# Patient Record
Sex: MALE | Race: WHITE
[De-identification: names, ages, dates, MRNs, and addresses within clinical notes are randomized per-mention and may not be internally consistent; named-entity substitution may affect disease eponyms.]

---

## 2017-03-07 ENCOUNTER — HOSPITAL ENCOUNTER (EMERGENCY)
Dept: HOSPITAL 22 - ER | Age: 37
Discharge: STILL ADMITTED OR ADMINISTRATIVE | End: 2017-03-07
Payer: COMMERCIAL

## 2017-03-07 VITALS — WEIGHT: 200 LBS | BODY MASS INDEX: 28.63 KG/M2 | HEIGHT: 70 IN

## 2017-03-07 VITALS — DIASTOLIC BLOOD PRESSURE: 74 MMHG | SYSTOLIC BLOOD PRESSURE: 122 MMHG

## 2017-03-07 DIAGNOSIS — R10.12: ICD-10-CM

## 2017-03-07 DIAGNOSIS — K50.911: Primary | ICD-10-CM

## 2017-03-07 DIAGNOSIS — Z72.0: ICD-10-CM

## 2017-03-07 DIAGNOSIS — K52.9: ICD-10-CM

## 2017-03-07 LAB
BASOPHILS # BLD AUTO: 2.4 K/MM3 (ref 0.7–4.5)
BUN: 14 MG/DL (ref 7–18)
EOSINOPHIL NFR BLD AUTO: 28.4 % (ref 10–50)
GFR SERPLBLD BASED ON 1.73 SQ M-ARVRAT: 96 ML/MIN (ref 60–?)
HCT VFR BLD CALC: 16.4 G/DL (ref 14.1–18)

## 2017-03-07 NOTE — EMERGENCY ROOM REPORT
History of Present Illness
Time Seen by MD Oconnor
Presenting Problem in Triage
Pt arrived:Walked    
Presenting Problem:BLEEDING FROM BOWELS 
Onset of symptoms date/time:03/0606/17/0800 or onset unknown for:
Treatment Prior to Arrival:    PTA Provided by:
 
Sepsis Risk Assessment: 
Temp: 98.2 B/P: 136/87 MAP: 103 Pulse: 100 Resp: 18
Recent fever? N Clinical Suspician of Infection? N 
Mental Status: 1 - Regular (Normal Baseline)   Sepsis Risk:Low Sepsis Risk 
 
Have you (or family members/close friends) recently traveled outside the [place]? N
If Yes, where/when: 
Have you had exposure to infectious disease within the past month? N TB? 
Other?  Specify: 
 
Comment
The patient has Crohn's disease. He complains of passing bright red blood per 
rectum for the past 18 hours. He says he has had 5 episodes passing about 1/2 
cup each time. He has some dull achy pain in his LEFT upper abdomen that started
about 36 hours ago. No fever. No vomiting. He feels fatigued. He has not been on
any medication for Crohn's disease for more than 10 years. Previously he was on 
Asacol. He had a gastroenterologist that he was sitting until about 7-8 months 
ago. He was supposed to have a colonoscopy, but there was a mixup and the 
scheduling. No evaluation or treatment since then. Approximately one year since 
his last flareup Crohn's disease.
ALLERGIES
Coded Allergies:
codeine (12/10/15)
 
Home Medications
Active Scripts
Prednisone (Prednisone 20MG*****) 20 MG PO BID 
     3 Days 
     Prov:      12/11/15
Nicotine (Nicoderm Cq) 21 MG TD DAILY 
     #30 PATCH 
     Prov:      12/11/15
 
 
(Carlos Velázquez MD)
 
History
 
Medical History
General
   CAD? No
   Angina: No
   MI: No
   Hypertension? No
   Hyperlipidemia? No
   CHF? No
   DVT? No
   PE? No
   COPD? No
   Asthma? No
   Anemia? No
   GERD? No
   Gastric ulcers? No
   GI Bleed? No
   Hernia? No
   Thyroid Problems? No
   Hypothyroidism? No
   CVA? No
   Seizures? No
   Diabetes? No
   Insulin Dependent: No
   Insulin Pump: No
   Home FSBS? No
   Renal Insuffiency? No
   End Stage Renal Disease? No
   UTI? No
   Stones? No
   BPH? No
   GB Disease: No
   Nephritic Syndrome? No
   Asplenia? No
   Hepatitis? No
   Sickle Cell Disease? No
   Arthritis? No
   Migraines? No
   Cataracts? No
   Glaucoma? No
   MRSA? No
   HIV? No
   TB? No
   Anxiety? No
   Depression? No
   Cancer? No
   More? Yes
   Additional hx:
CHRONS
Immunization Hx
   DT/Tetanus > 10 Years Ago
   Flu 2015-16FSN
   Pneumonia Refuses
Surgical Hx
Previous Surgery?Y  MASTOIDECTOMY L   EAR TUBES   FACIAL   MASTOIDECTOMY R
              
            
 
 
Family History
Family Hx
   Diabetes Yes
   CAD No
   Hypertension No
   Hyperlipidemia No
   Cancer Yes
   TB No
 
Social History
Smoking Hx
   Smoker: Current Every Day Smoker
   Tobacco: Yes
   Type Cigarettes
   Packs/day 1 1/2 - 2 Packs
Alcohol
   Alcohol: No
 
Additionial History
Additional History
reviewed records, admit here 12/10/15 for Crohn's flare.
(Carlos Velázquez MD)
 
Review of Systems
All Other Systems Reviewed and Negative
Constitutional
denies fever, malaise
Gastrointestinal
see HPI, abdominal pain, denies vomiting
(Carlos Velázquez MD)
 
Physical Exam
Vital Signs
 Vital Signs
 
 
Date Time Temp Pulse Resp B/P Pulse O2 O2 Flow FiO2
 
     Ox Delivery Rate 
 
03/07 2151  74 20 122/74 97   
 
03/07 2013  86 20 124/69 97   
 
03/07 1929  95 18 127/84 96   
 
03/07 1847 98.2 100 18 136/87 98   
 
 
General Appearance normal appearance, WD/WN
Eye Exam
   -
   bilateral eye normal exam, bilateral eye PERRL, bilateral eye EOMI
Ear, Nose, Throat hearing grossly normal, normal ENT inspection
Neck normal inspection, non-tender, supple, full range of motion
Respiratory Status
Yes: trachea midline, chest symmetrical, non tender chest.  No: respiratory 
distress. 
Lung Sounds
bilateral: normal breath sounds, lungs clear. 
Cardiovascular normal exam, regular rate/rhythm, no peripheral edema, no gallop,
no JVD, no murmur, no rub, normal peripheral pulses
Peripheral Pulses
   Pulses normal Yes
Gastrointestinal normal bowel sounds, soft, no organomegaly, no guarding, no 
rebound, tenderness (LEFT upper quadrant)
Back normal inspection, no CVA tenderness, no vertebral tenderness
Extremities non-tender, normal range of motion, normal inspection
Neurologic alert, CNs II-XII nml as tested, normal exam, oriented x 3
Mental status normal mood/affect
Skin intact, normal color, warm/dry
(Carlos Velázquez MD)
 
Medical Decision Making
 
LABS/Meds/Orders
Pt receiving controlled substance in ED? No
Results/Orders
 Laboratory Tests
 
 
03/07/17 1930:
Sodium 140, Potassium 4.5, Chloride 104, Carbon Dioxide 27, BUN 14, Creatinine 
0.9, Estimated Creat Clear 147, Estimated GFR (MDRD) 96, Glucose 94, Calcium 9.0
, Total Bilirubin 0.2, AST 8  L, ALT 36, Alkaline Phosphatase 53, Total Protein 
7.6, Albumin 3.9, Globulin 3.7  H, Albumin/Globulin Ratio 1.1, Lipase 101, WBC 
8.6, RBC 5.06, Hgb 16.4, Hct 48.7, MCV 96.2, RDW 13.4, Plt Count 197, MPV 6.0  L
, Gran % 63.4, Gran # 5.4, Lymphocytes % 28.4, Monocytes % 5.9, Eosinophils % 
1.7, Basophils % 0.5, Lymphocytes # 2.4, Monocytes # 0.5, Eosinophils # 0.2, 
Basophils # 0.0, PUBS MCHC 33.8, ESR 9, MCH 32.5  H
 Current Medication Orders
 
 
  Sig/Noman Start time  Last
 
Medication Dose Route Stop Time Status Admin
 
Iopamidol 75 ML ONCE ONE 03/07 2115 UNV 03/07
 
  IV 03/07 2116 2105
 
Sodium Chloride 10 ML ONCE ONE 03/07 2115 UNV 03/07
 
  IV 03/07 2116 2105
 
Diatrizoate Meglum/ 30 ML ONCE ONE 03/07 1915 DC 03/07
 
Diatrizoate Sod  PO 03/07 1916 1913
 
Sodium Chloride 10 ML PRN PRN 03/07 1915 AC 
 
  IV 03/08 1907  
 
Diatrizoate Meglum/ 0 .STK-MED ONE 03/07 1911 DC 
 
Diatrizoate Sod  .ROUTE   
 
 
 Orders
 
 
Procedure Date/time Status
 
DIET-NOTHING BY MOUTH 03/08 B Active
 
CT ABD & PELVIS W/ CONTRAST 03/07 2038 Active
 
CT ABD/PELVIS REQ************* 03/07 1908 Complete
 
IV SALINE LOCK 03/07 1908 Active
 
LIPASE 03/07 1908 Complete
 
SED RATE 03/07 1908 Complete
 
CBC WITH AUTO DIFF 03/07 1908 Complete
 
CHEM 12 PROFILE 03/07 1908 Complete
 
 
 
Progress
-
8:00 PM:  At shift change, I have discussed the patient with Dr. Jeong, who 
will assume care of the patient at this time.  I have discussed all clinical 
information including history, physical and diagnostic study results.  
Preliminary diagnoses based on information available at this point have been 
recorded by me.
(Carlos Velázquez MD)
 
XRAY/CT/US
XRAY/CT/US
   CT abdomen, pelvis
   CT interpretation by discussed w/radiologist
   Time results known: 2209
   CT Results abnormal (colitis)
(JANIE Jeong MD)
 
Departure
 
Departure
Disposition Still a Patient
Condition STABLE
ED Critical Care
   Critical Care No
(Carlos Velázquez MD)
 
Departure
Time of Disposition 2209
Clinical Impression
Primary Impression: Abdominal pain
Qualifiers:  Abdominal location: left upper quadrant Qualified Code: R10.12 - 
Left upper quadrant pain
Secondary Impressions: Colitis
 
Crohn's disease
Qualifiers:  Gastrointestinal tract location: unspecified location Digestive 
disease complication type: with rectal bleeding Qualified Code: K50.911 - Crohn'
s disease, unspecified, with rectal bleeding
 
Rectal bleeding
Patient Instructions DI for Abdominal Pain-Adult
Additional Instructions
fluids and call pcp for follow up 
Discharge Counseling
   Counseled pt/family regarding diagnosis, test results, medications/RX, follow
up needs
Prescriptions
Current Visit Scripts
Prednisone (Prednisone 20MG*****) 20 MG PO BID 
     #10 TAB 
 
 
(JANIE Jeong MD)
 
Electronically Signed by Carlos Velázquez MD on 03/07/17 at 1953
Electronically Signed by JANIE Jeong MD on 03/07/17 at 5444

## 2017-03-07 NOTE — EMERGENCY ROOM REPORT
History of Present Illness
Time Seen by MD Oconnor
Presenting Problem in Triage
Pt arrived:Walked    
Presenting Problem:BLEEDING FROM BOWELS 
Onset of symptoms date/time:03/0606/17/0800 or onset unknown for:
Treatment Prior to Arrival:    PTA Provided by:
 
Sepsis Risk Assessment: 
Temp: 98.2 B/P: 136/87 MAP: 103 Pulse: 100 Resp: 18
Recent fever? N Clinical Suspician of Infection? N 
Mental Status: 1 - Regular (Normal Baseline)   Sepsis Risk:Low Sepsis Risk 
 
Have you (or family members/close friends) recently traveled outside the [place]? N
If Yes, where/when: 
Have you had exposure to infectious disease within the past month? N TB? 
Other?  Specify: 
 
Comment
The patient has Crohn's disease. He complains of passing bright red blood per 
rectum for the past 18 hours. He says he has had 5 episodes passing about 1/2 
cup each time. He has some dull achy pain in his LEFT upper abdomen that started
about 36 hours ago. No fever. No vomiting. He feels fatigued. He has not been on
any medication for Crohn's disease for more than 10 years. Previously he was on 
Asacol. He had a gastroenterologist that he was sitting until about 7-8 months 
ago. He was supposed to have a colonoscopy, but there was a mixup and the 
scheduling. No evaluation or treatment since then. Approximately one year since 
his last flareup Crohn's disease.
ALLERGIES
Coded Allergies:
codeine (12/10/15)
 
Home Medications
Active Scripts
Prednisone (Prednisone 20MG*****) 20 MG PO BID 
     3 Days 
     Prov:      12/11/15
Nicotine (Nicoderm Cq) 21 MG TD DAILY 
     #30 PATCH 
     Prov:      12/11/15
 
 
(Carlos Velázquez MD)
 
History
 
Medical History
General
   CAD? No
   Angina: No
   MI: No
   Hypertension? No
   Hyperlipidemia? No
   CHF? No
   DVT? No
   PE? No
   COPD? No
   Asthma? No
   Anemia? No
   GERD? No
   Gastric ulcers? No
   GI Bleed? No
   Hernia? No
   Thyroid Problems? No
   Hypothyroidism? No
   CVA? No
   Seizures? No
   Diabetes? No
   Insulin Dependent: No
   Insulin Pump: No
   Home FSBS? No
   Renal Insuffiency? No
   End Stage Renal Disease? No
   UTI? No
   Stones? No
   BPH? No
   GB Disease: No
   Nephritic Syndrome? No
   Asplenia? No
   Hepatitis? No
   Sickle Cell Disease? No
   Arthritis? No
   Migraines? No
   Cataracts? No
   Glaucoma? No
   MRSA? No
   HIV? No
   TB? No
   Anxiety? No
   Depression? No
   Cancer? No
   More? Yes
   Additional hx:
CHRONS
Immunization Hx
   DT/Tetanus > 10 Years Ago
   Flu 2015-16FSN
   Pneumonia Refuses
Surgical Hx
Previous Surgery?Y  MASTOIDECTOMY L   EAR TUBES   FACIAL   MASTOIDECTOMY R
              
            
 
 
Family History
Family Hx
   Diabetes Yes
   CAD No
   Hypertension No
   Hyperlipidemia No
   Cancer Yes
   TB No
 
Social History
Smoking Hx
   Smoker: Current Every Day Smoker
   Tobacco: Yes
   Type Cigarettes
   Packs/day 1 1/2 - 2 Packs
Alcohol
   Alcohol: No
 
Additionial History
Additional History
reviewed records, admit here 12/10/15 for Crohn's flare.
(Carlos Velázquez MD)
 
Review of Systems
All Other Systems Reviewed and Negative
Constitutional
denies fever, malaise
Gastrointestinal
see HPI, abdominal pain, denies vomiting
(Carlos Velázquez MD)
 
Physical Exam
Vital Signs
 Vital Signs
 
 
Date Time Temp Pulse Resp B/P Pulse O2 O2 Flow FiO2
 
     Ox Delivery Rate 
 
03/07 2151  74 20 122/74 97   
 
03/07 2013  86 20 124/69 97   
 
03/07 1929  95 18 127/84 96   
 
03/07 1847 98.2 100 18 136/87 98   
 
 
General Appearance normal appearance, WD/WN
Eye Exam
   -
   bilateral eye normal exam, bilateral eye PERRL, bilateral eye EOMI
Ear, Nose, Throat hearing grossly normal, normal ENT inspection
Neck normal inspection, non-tender, supple, full range of motion
Respiratory Status
Yes: trachea midline, chest symmetrical, non tender chest.  No: respiratory 
distress. 
Lung Sounds
bilateral: normal breath sounds, lungs clear. 
Cardiovascular normal exam, regular rate/rhythm, no peripheral edema, no gallop,
no JVD, no murmur, no rub, normal peripheral pulses
Peripheral Pulses
   Pulses normal Yes
Gastrointestinal normal bowel sounds, soft, no organomegaly, no guarding, no 
rebound, tenderness (LEFT upper quadrant)
Back normal inspection, no CVA tenderness, no vertebral tenderness
Extremities non-tender, normal range of motion, normal inspection
Neurologic alert, CNs II-XII nml as tested, normal exam, oriented x 3
Mental status normal mood/affect
Skin intact, normal color, warm/dry
(Carlos Velázquez MD)
 
Medical Decision Making
 
LABS/Meds/Orders
Pt receiving controlled substance in ED? No
Results/Orders
 Laboratory Tests
 
 
03/07/17 1930:
Sodium 140, Potassium 4.5, Chloride 104, Carbon Dioxide 27, BUN 14, Creatinine 
0.9, Estimated Creat Clear 147, Estimated GFR (MDRD) 96, Glucose 94, Calcium 9.0
, Total Bilirubin 0.2, AST 8  L, ALT 36, Alkaline Phosphatase 53, Total Protein 
7.6, Albumin 3.9, Globulin 3.7  H, Albumin/Globulin Ratio 1.1, Lipase 101, WBC 
8.6, RBC 5.06, Hgb 16.4, Hct 48.7, MCV 96.2, RDW 13.4, Plt Count 197, MPV 6.0  L
, Gran % 63.4, Gran # 5.4, Lymphocytes % 28.4, Monocytes % 5.9, Eosinophils % 
1.7, Basophils % 0.5, Lymphocytes # 2.4, Monocytes # 0.5, Eosinophils # 0.2, 
Basophils # 0.0, PUBS MCHC 33.8, ESR 9, MCH 32.5  H
 Current Medication Orders
 
 
  Sig/Noman Start time  Last
 
Medication Dose Route Stop Time Status Admin
 
Iopamidol 75 ML ONCE ONE 03/07 2115 UNV 03/07
 
  IV 03/07 2116 2105
 
Sodium Chloride 10 ML ONCE ONE 03/07 2115 UNV 03/07
 
  IV 03/07 2116 2105
 
Diatrizoate Meglum/ 30 ML ONCE ONE 03/07 1915 DC 03/07
 
Diatrizoate Sod  PO 03/07 1916 1913
 
Sodium Chloride 10 ML PRN PRN 03/07 1915 AC 
 
  IV 03/08 1907  
 
Diatrizoate Meglum/ 0 .STK-MED ONE 03/07 1911 DC 
 
Diatrizoate Sod  .ROUTE   
 
 
 Orders
 
 
Procedure Date/time Status
 
DIET-NOTHING BY MOUTH 03/08 B Active
 
CT ABD & PELVIS W/ CONTRAST 03/07 2038 Active
 
CT ABD/PELVIS REQ************* 03/07 1908 Complete
 
IV SALINE LOCK 03/07 1908 Active
 
LIPASE 03/07 1908 Complete
 
SED RATE 03/07 1908 Complete
 
CBC WITH AUTO DIFF 03/07 1908 Complete
 
CHEM 12 PROFILE 03/07 1908 Complete
 
 
 
Progress
-
8:00 PM:  At shift change, I have discussed the patient with Dr. Jeong, who 
will assume care of the patient at this time.  I have discussed all clinical 
information including history, physical and diagnostic study results.  
Preliminary diagnoses based on information available at this point have been 
recorded by me.
(Carlos Velázquez MD)
 
XRAY/CT/US
XRAY/CT/US
   CT abdomen, pelvis
   CT interpretation by discussed w/radiologist
   Time results known: 2209
   CT Results abnormal (colitis)
(JANIE Jeong MD)
 
Departure
 
Departure
Disposition Still a Patient
Condition STABLE
ED Critical Care
   Critical Care No
(Carlos Velázquez MD)
 
Departure
Time of Disposition 2209
Clinical Impression
Primary Impression: Abdominal pain
Qualifiers:  Abdominal location: left upper quadrant Qualified Code: R10.12 - 
Left upper quadrant pain
Secondary Impressions: Colitis
 
Crohn's disease
Qualifiers:  Gastrointestinal tract location: unspecified location Digestive 
disease complication type: with rectal bleeding Qualified Code: K50.911 - Crohn'
s disease, unspecified, with rectal bleeding
 
Rectal bleeding
Patient Instructions DI for Abdominal Pain-Adult
Additional Instructions
fluids and call pcp for follow up 
Discharge Counseling
   Counseled pt/family regarding diagnosis, test results, medications/RX, follow
up needs
Prescriptions
Current Visit Scripts
Prednisone (Prednisone 20MG*****) 20 MG PO BID 
     #10 TAB 
 
 
(JANIE Jeong MD)
 
Electronically Signed by Carlos Velázquez MD on 03/07/17 at 1953
Electronically Signed by JANIE Jeong MD on 03/07/17 at 2069

## 2017-03-08 NOTE — RADIOLOGY REPORT PS360
CT ABD   PELVIS W/ CONTRAST
 
CLINICAL INDICATION: Crohn's disease, rectal bleeding, abdominal pain
CROHNS DISEASE, RECTAL BLEEDING, ABD PAIN
ORDERING PHYSICIAN: JANIE Jeong MD
PATIENT AGE:  36 years
 
 
COMPARISON: 12/10/2015
 
TECHNIQUE: Axial images obtained with sagittal and coronal reformats.
 
PROCEDURE: 
Oral Contrast: Redicat
IV Contrast: 75 mL of Isovue-370.
 
FINDINGS:
 
Lower thorax: No acute finding 
 
ABDOMEN:
Liver: No masses or biliary dilatation.
Gallbladder: Nondistended. No radio opaque stones.
Pancreas: No masses or peripancreatic fluid collections.
Spleen: Unremarkable.
Adrenals: No change 3 x 2 cm right adrenal mass consistent with adenoma
Kidneys/ureters: No masses. No renal calculi. No hydronephrosis. No perinephric
fluid collections. No ureteral dilatation or obvious ureteral calculi.
Stomach bowel: Nonspecific low attenuation within the colon wall with mild wall
thickening and may be due to decompressed bowel or colitis. Periumbilical hernia
containing fat
Appendix: No evidence of appendicitis.
 
PELVIS:
Reproductive: Unremarkable 
Bladder: Nondistended. No obvious stones or masses.
 
ABDOMEN & PELVIS: 
Peritoneum: No abnormal fluid collections. No obvious inflammatory changes. No
free air.
Lymph nodes: No enlarged lymph nodes apparent.
Vasculature: No evidence of abdominal aortic aneurysm. No retroperitoneal
hemorrhage evident.
Bones: No acute fracture
 
IMPRESSION: 
1. Nonspecific low attenuation within the colon with mild thickening which may
be due to decompressed bowel or mild colitis.
2. Otherwise negative CT abdomen pelvis

## 2020-02-27 ENCOUNTER — OFFICE VISIT (OUTPATIENT)
Dept: RETAIL CLINIC | Facility: CLINIC | Age: 40
End: 2020-02-27

## 2020-02-27 VITALS
SYSTOLIC BLOOD PRESSURE: 129 MMHG | HEART RATE: 101 BPM | TEMPERATURE: 97.9 F | WEIGHT: 213 LBS | HEIGHT: 70 IN | DIASTOLIC BLOOD PRESSURE: 89 MMHG | OXYGEN SATURATION: 96 % | RESPIRATION RATE: 15 BRPM | BODY MASS INDEX: 30.49 KG/M2

## 2020-02-27 DIAGNOSIS — R52 BODY ACHES: ICD-10-CM

## 2020-02-27 DIAGNOSIS — H60.312 ACUTE DIFFUSE OTITIS EXTERNA OF LEFT EAR: ICD-10-CM

## 2020-02-27 DIAGNOSIS — H65.112 ACUTE MUCOID OTITIS MEDIA OF LEFT EAR: Primary | ICD-10-CM

## 2020-02-27 DIAGNOSIS — J02.9 SORE THROAT: ICD-10-CM

## 2020-02-27 LAB
EXPIRATION DATE: NORMAL
EXPIRATION DATE: NORMAL
FLUAV AG NPH QL: NEGATIVE
FLUBV AG NPH QL: NEGATIVE
INTERNAL CONTROL: NORMAL
INTERNAL CONTROL: NORMAL
Lab: NORMAL
Lab: NORMAL
S PYO AG THROAT QL: NEGATIVE

## 2020-02-27 PROCEDURE — 99203 OFFICE O/P NEW LOW 30 MIN: CPT | Performed by: NURSE PRACTITIONER

## 2020-02-27 PROCEDURE — 87880 STREP A ASSAY W/OPTIC: CPT | Performed by: NURSE PRACTITIONER

## 2020-02-27 PROCEDURE — 87804 INFLUENZA ASSAY W/OPTIC: CPT | Performed by: NURSE PRACTITIONER

## 2020-02-27 RX ORDER — CEFDINIR 300 MG/1
300 CAPSULE ORAL 2 TIMES DAILY
Qty: 20 CAPSULE | Refills: 0 | Status: SHIPPED | OUTPATIENT
Start: 2020-02-27 | End: 2020-03-08

## 2020-02-27 RX ORDER — PSEUDOEPHEDRINE HCL 120 MG/1
120 TABLET, FILM COATED, EXTENDED RELEASE ORAL EVERY 12 HOURS
Qty: 20 TABLET | Refills: 0 | Status: SHIPPED | OUTPATIENT
Start: 2020-02-27 | End: 2020-03-08

## 2021-01-07 ENCOUNTER — HOSPITAL ENCOUNTER (OUTPATIENT)
Age: 41
End: 2021-01-07
Payer: COMMERCIAL

## 2021-01-07 DIAGNOSIS — U07.1: ICD-10-CM

## 2021-01-07 DIAGNOSIS — Z20.828: Primary | ICD-10-CM

## 2021-01-07 PROCEDURE — U0003 INFECTIOUS AGENT DETECTION BY NUCLEIC ACID (DNA OR RNA); SEVERE ACUTE RESPIRATORY SYNDROME CORONAVIRUS 2 (SARS-COV-2) (CORONAVIRUS DISEASE [COVID-19]), AMPLIFIED PROBE TECHNIQUE, MAKING USE OF HIGH THROUGHPUT TECHNOLOGIES AS DESCRIBED BY CMS-2020-01-R: HCPCS

## 2021-08-28 ENCOUNTER — HOSPITAL ENCOUNTER (EMERGENCY)
Dept: HOSPITAL 22 - ER | Age: 41
LOS: 1 days | Discharge: HOME | End: 2021-08-29
Payer: COMMERCIAL

## 2021-08-28 VITALS — OXYGEN SATURATION: 96 % | DIASTOLIC BLOOD PRESSURE: 70 MMHG | HEART RATE: 75 BPM | SYSTOLIC BLOOD PRESSURE: 130 MMHG

## 2021-08-28 VITALS
OXYGEN SATURATION: 97 % | HEART RATE: 87 BPM | RESPIRATION RATE: 19 BRPM | SYSTOLIC BLOOD PRESSURE: 111 MMHG | TEMPERATURE: 98 F | DIASTOLIC BLOOD PRESSURE: 70 MMHG | BODY MASS INDEX: 33 KG/M2

## 2021-08-28 DIAGNOSIS — R10.13: Primary | ICD-10-CM

## 2021-08-28 LAB
ALBUMIN LEVEL: 3.9 G/DL (ref 3.5–5)
ALBUMIN/GLOB SERPL: 1.2 {RATIO} (ref 1.1–1.8)
ALP ISO SERPL-ACNC: 54 U/L (ref 38–126)
ALT SERPLBLD-CCNC: 26 U/L (ref 12–78)
AMYLASE SERPL-CCNC: 70 U/L (ref 30–110)
ANION GAP SERPL CALC-SCNC: 16.2 MEQ/L (ref 5–15)
AST SERPL QL: 26 U/L (ref 17–59)
BILIRUBIN,TOTAL: 0.2 MG/DL (ref 0.2–1.3)
BUN SERPL-MCNC: 12 MG/DL (ref 9–20)
CALCIUM SPEC-MCNC: 9 MG/DL (ref 8.4–10.2)
CHLORIDE SPEC-SCNC: 105 MMOL/L (ref 98–107)
CO2 SERPL-SCNC: 24 MMOL/L (ref 22–30)
COLOR UR: YELLOW
CREAT BLD-SCNC: 0.9 MG/DL (ref 0.66–1.25)
CREATININE CLEARANCE ESTIMATED: 163 ML/MIN (ref 50–200)
ESTIMATED GLOMERULAR FILT RATE: 94 ML/MIN (ref 60–?)
GFR (AFRICAN AMERICAN): 114 ML/MIN (ref 60–?)
GLOBULIN SER CALC-MCNC: 3.2 G/DL (ref 1.3–3.2)
GLUCOSE: 105 MG/DL (ref 74–100)
HCT VFR BLD CALC: 47.3 % (ref 42–52)
HGB BLD-MCNC: 16 G/DL (ref 14.1–18)
LIPASE: 103 U/L (ref 23–300)
MCHC RBC-ENTMCNC: 33.8 G/DL (ref 31.8–35.4)
MCV RBC: 95.1 FL (ref 80–94)
MEAN CORPUSCULAR HEMOGLOBIN: 32.2 PG (ref 27–31.2)
MICRO URNS: (no result)
PH UR: 6 [PH] (ref 5–8.5)
PLATELET # BLD: 279 K/MM3 (ref 142–424)
POTASSIUM: 4.2 MMOL/L (ref 3.5–5.1)
PROT SERPL-MCNC: 7.1 G/DL (ref 6.3–8.2)
RBC # BLD AUTO: 4.97 M/MM3 (ref 4.6–6.2)
SODIUM SPEC-SCNC: 141 MMOL/L (ref 136–145)
SP GR UR: >= 1.03 (ref 1–1.03)
UROBILINOGEN UR QL: 0.2 EU/DL
WBC # BLD AUTO: 9.7 K/MM3 (ref 4.8–10.8)

## 2021-08-28 PROCEDURE — 99282 EMERGENCY DEPT VISIT SF MDM: CPT

## 2021-08-28 PROCEDURE — 80053 COMPREHEN METABOLIC PANEL: CPT

## 2021-08-28 PROCEDURE — 82150 ASSAY OF AMYLASE: CPT

## 2021-08-28 PROCEDURE — 81001 URINALYSIS AUTO W/SCOPE: CPT

## 2021-08-28 PROCEDURE — 83690 ASSAY OF LIPASE: CPT

## 2021-08-28 PROCEDURE — 96375 TX/PRO/DX INJ NEW DRUG ADDON: CPT

## 2021-08-28 PROCEDURE — 96374 THER/PROPH/DIAG INJ IV PUSH: CPT

## 2021-08-28 PROCEDURE — 85025 COMPLETE CBC W/AUTO DIFF WBC: CPT

## 2021-08-29 VITALS
RESPIRATION RATE: 16 BRPM | OXYGEN SATURATION: 96 % | DIASTOLIC BLOOD PRESSURE: 80 MMHG | TEMPERATURE: 98.1 F | HEART RATE: 77 BPM | SYSTOLIC BLOOD PRESSURE: 113 MMHG

## 2021-08-29 VITALS — DIASTOLIC BLOOD PRESSURE: 68 MMHG | SYSTOLIC BLOOD PRESSURE: 102 MMHG | OXYGEN SATURATION: 96 % | HEART RATE: 73 BPM

## 2021-12-23 ENCOUNTER — HOSPITAL ENCOUNTER (EMERGENCY)
Age: 41
Discharge: HOME | End: 2021-12-23
Payer: COMMERCIAL

## 2021-12-23 VITALS
HEART RATE: 94 BPM | SYSTOLIC BLOOD PRESSURE: 134 MMHG | DIASTOLIC BLOOD PRESSURE: 78 MMHG | RESPIRATION RATE: 18 BRPM | OXYGEN SATURATION: 98 % | TEMPERATURE: 98.06 F

## 2021-12-23 VITALS
TEMPERATURE: 98.1 F | DIASTOLIC BLOOD PRESSURE: 78 MMHG | RESPIRATION RATE: 18 BRPM | HEART RATE: 94 BPM | SYSTOLIC BLOOD PRESSURE: 134 MMHG

## 2021-12-23 VITALS — BODY MASS INDEX: 32.3 KG/M2

## 2021-12-23 VITALS — OXYGEN SATURATION: 98 % | RESPIRATION RATE: 18 BRPM

## 2021-12-23 DIAGNOSIS — F17.210: ICD-10-CM

## 2021-12-23 DIAGNOSIS — J02.9: ICD-10-CM

## 2021-12-23 DIAGNOSIS — S19.9XXA: Primary | ICD-10-CM

## 2021-12-23 PROCEDURE — 99202 OFFICE O/P NEW SF 15 MIN: CPT

## 2021-12-23 PROCEDURE — 70360 X-RAY EXAM OF NECK: CPT

## 2021-12-23 PROCEDURE — G0463 HOSPITAL OUTPT CLINIC VISIT: HCPCS

## 2022-09-23 ENCOUNTER — HOSPITAL ENCOUNTER (EMERGENCY)
Age: 42
Discharge: HOME | End: 2022-09-23
Payer: COMMERCIAL

## 2022-09-23 VITALS
TEMPERATURE: 97.88 F | HEART RATE: 98 BPM | OXYGEN SATURATION: 97 % | DIASTOLIC BLOOD PRESSURE: 99 MMHG | RESPIRATION RATE: 18 BRPM | SYSTOLIC BLOOD PRESSURE: 130 MMHG

## 2022-09-23 VITALS
TEMPERATURE: 97.88 F | RESPIRATION RATE: 18 BRPM | SYSTOLIC BLOOD PRESSURE: 130 MMHG | DIASTOLIC BLOOD PRESSURE: 99 MMHG | HEART RATE: 98 BPM

## 2022-09-23 VITALS — BODY MASS INDEX: 33.2 KG/M2

## 2022-09-23 DIAGNOSIS — J40: Primary | ICD-10-CM

## 2022-09-23 PROCEDURE — 99212 OFFICE O/P EST SF 10 MIN: CPT

## 2022-09-23 PROCEDURE — G0463 HOSPITAL OUTPT CLINIC VISIT: HCPCS

## 2022-09-23 PROCEDURE — 71046 X-RAY EXAM CHEST 2 VIEWS: CPT

## 2022-09-23 PROCEDURE — 96372 THER/PROPH/DIAG INJ SC/IM: CPT

## 2022-10-23 ENCOUNTER — HOSPITAL ENCOUNTER (EMERGENCY)
Age: 42
Discharge: HOME | End: 2022-10-23
Payer: COMMERCIAL

## 2022-10-23 VITALS
HEART RATE: 108 BPM | RESPIRATION RATE: 18 BRPM | TEMPERATURE: 98.06 F | DIASTOLIC BLOOD PRESSURE: 57 MMHG | SYSTOLIC BLOOD PRESSURE: 114 MMHG | OXYGEN SATURATION: 99 %

## 2022-10-23 VITALS
TEMPERATURE: 98 F | HEART RATE: 98 BPM | OXYGEN SATURATION: 99 % | SYSTOLIC BLOOD PRESSURE: 104 MMHG | RESPIRATION RATE: 18 BRPM | DIASTOLIC BLOOD PRESSURE: 69 MMHG

## 2022-10-23 VITALS — BODY MASS INDEX: 32.3 KG/M2

## 2022-10-23 DIAGNOSIS — L05.91: Primary | ICD-10-CM

## 2022-10-23 PROCEDURE — 99283 EMERGENCY DEPT VISIT LOW MDM: CPT

## 2022-10-23 PROCEDURE — 10080 I&D PILONIDAL CYST SIMPLE: CPT

## 2022-11-21 ENCOUNTER — HOSPITAL ENCOUNTER (EMERGENCY)
Age: 42
Discharge: HOME | End: 2022-11-21
Payer: COMMERCIAL

## 2022-11-21 VITALS — BODY MASS INDEX: 29.7 KG/M2

## 2022-11-21 VITALS
HEART RATE: 87 BPM | TEMPERATURE: 98 F | SYSTOLIC BLOOD PRESSURE: 130 MMHG | RESPIRATION RATE: 18 BRPM | OXYGEN SATURATION: 98 % | DIASTOLIC BLOOD PRESSURE: 80 MMHG

## 2022-11-21 VITALS
DIASTOLIC BLOOD PRESSURE: 80 MMHG | RESPIRATION RATE: 18 BRPM | HEART RATE: 87 BPM | OXYGEN SATURATION: 98 % | SYSTOLIC BLOOD PRESSURE: 130 MMHG | TEMPERATURE: 98 F

## 2022-11-21 DIAGNOSIS — H66.90: Primary | ICD-10-CM

## 2022-11-21 PROCEDURE — 99212 OFFICE O/P EST SF 10 MIN: CPT

## 2022-11-21 PROCEDURE — G0463 HOSPITAL OUTPT CLINIC VISIT: HCPCS

## 2022-11-21 PROCEDURE — 87804 INFLUENZA ASSAY W/OPTIC: CPT

## 2022-12-30 ENCOUNTER — HOSPITAL ENCOUNTER (EMERGENCY)
Age: 42
Discharge: HOME | End: 2022-12-30
Payer: COMMERCIAL

## 2022-12-30 VITALS
SYSTOLIC BLOOD PRESSURE: 119 MMHG | OXYGEN SATURATION: 98 % | HEART RATE: 89 BPM | RESPIRATION RATE: 16 BRPM | TEMPERATURE: 98.7 F | DIASTOLIC BLOOD PRESSURE: 68 MMHG

## 2022-12-30 VITALS — BODY MASS INDEX: 30.1 KG/M2

## 2022-12-30 VITALS
OXYGEN SATURATION: 98 % | SYSTOLIC BLOOD PRESSURE: 98 MMHG | HEART RATE: 103 BPM | DIASTOLIC BLOOD PRESSURE: 62 MMHG | TEMPERATURE: 98.06 F | RESPIRATION RATE: 16 BRPM

## 2022-12-30 DIAGNOSIS — L05.91: Primary | ICD-10-CM

## 2022-12-30 PROCEDURE — 99283 EMERGENCY DEPT VISIT LOW MDM: CPT

## 2022-12-30 PROCEDURE — 10080 I&D PILONIDAL CYST SIMPLE: CPT

## 2023-01-03 ENCOUNTER — HOSPITAL ENCOUNTER (OUTPATIENT)
Dept: HOSPITAL 22 - LAB | Age: 43
End: 2023-01-03
Payer: COMMERCIAL

## 2023-01-03 DIAGNOSIS — L05.91: Primary | ICD-10-CM

## 2023-01-03 LAB
ANION GAP SERPL CALC-SCNC: 14.4 MEQ/L (ref 5–15)
BUN SERPL-MCNC: 14 MG/DL (ref 9–20)
CALCIUM SPEC-MCNC: 8.8 MG/DL (ref 8.4–10.2)
CHLORIDE SPEC-SCNC: 104 MMOL/L (ref 98–107)
CO2 SERPL-SCNC: 26 MMOL/L (ref 22–30)
CREAT BLD-SCNC: 0.8 MG/DL (ref 0.66–1.25)
ESTIMATED GLOMERULAR FILT RATE: 107 ML/MIN (ref 60–?)
GFR (AFRICAN AMERICAN): 129 ML/MIN (ref 60–?)
GLUCOSE: 89 MG/DL (ref 74–100)
HCT VFR BLD CALC: 48.8 % (ref 42–52)
HGB BLD-MCNC: 16.2 G/DL (ref 14.1–18)
MCHC RBC-ENTMCNC: 33.2 G/DL (ref 31.8–35.4)
MCV RBC: 93.6 FL (ref 80–94)
MEAN CORPUSCULAR HEMOGLOBIN: 31 PG (ref 27–31.2)
PLATELET # BLD: 370 K/MM3 (ref 142–424)
POTASSIUM: 4.4 MMOL/L (ref 3.5–5.1)
RBC # BLD AUTO: 5.21 M/MM3 (ref 4.6–6.2)
SODIUM SPEC-SCNC: 140 MMOL/L (ref 136–145)
WBC # BLD AUTO: 8.3 K/MM3 (ref 4.8–10.8)

## 2023-01-03 PROCEDURE — 80048 BASIC METABOLIC PNL TOTAL CA: CPT

## 2023-01-03 PROCEDURE — 85025 COMPLETE CBC W/AUTO DIFF WBC: CPT

## 2023-01-03 PROCEDURE — 36415 COLL VENOUS BLD VENIPUNCTURE: CPT

## 2023-01-23 ENCOUNTER — HOSPITAL ENCOUNTER (OUTPATIENT)
Age: 43
Discharge: HOME | End: 2023-01-23
Payer: COMMERCIAL

## 2023-01-23 VITALS
HEART RATE: 110 BPM | SYSTOLIC BLOOD PRESSURE: 134 MMHG | RESPIRATION RATE: 18 BRPM | DIASTOLIC BLOOD PRESSURE: 83 MMHG | OXYGEN SATURATION: 95 % | TEMPERATURE: 98.1 F

## 2023-01-23 VITALS
RESPIRATION RATE: 14 BRPM | SYSTOLIC BLOOD PRESSURE: 140 MMHG | TEMPERATURE: 97.52 F | HEART RATE: 78 BPM | OXYGEN SATURATION: 97 % | DIASTOLIC BLOOD PRESSURE: 92 MMHG

## 2023-01-23 VITALS
TEMPERATURE: 97.52 F | HEART RATE: 80 BPM | RESPIRATION RATE: 14 BRPM | DIASTOLIC BLOOD PRESSURE: 96 MMHG | SYSTOLIC BLOOD PRESSURE: 132 MMHG | OXYGEN SATURATION: 98 %

## 2023-01-23 VITALS
SYSTOLIC BLOOD PRESSURE: 137 MMHG | HEART RATE: 79 BPM | RESPIRATION RATE: 18 BRPM | OXYGEN SATURATION: 96 % | DIASTOLIC BLOOD PRESSURE: 93 MMHG

## 2023-01-23 VITALS
TEMPERATURE: 97.52 F | SYSTOLIC BLOOD PRESSURE: 137 MMHG | HEART RATE: 79 BPM | DIASTOLIC BLOOD PRESSURE: 94 MMHG | RESPIRATION RATE: 14 BRPM | OXYGEN SATURATION: 97 %

## 2023-01-23 VITALS
SYSTOLIC BLOOD PRESSURE: 122 MMHG | RESPIRATION RATE: 18 BRPM | DIASTOLIC BLOOD PRESSURE: 88 MMHG | OXYGEN SATURATION: 95 % | HEART RATE: 81 BPM

## 2023-01-23 VITALS
DIASTOLIC BLOOD PRESSURE: 93 MMHG | OXYGEN SATURATION: 93 % | RESPIRATION RATE: 10 BRPM | TEMPERATURE: 98.2 F | SYSTOLIC BLOOD PRESSURE: 127 MMHG | HEART RATE: 86 BPM

## 2023-01-23 VITALS
HEART RATE: 86 BPM | RESPIRATION RATE: 10 BRPM | TEMPERATURE: 97.2 F | OXYGEN SATURATION: 93 % | SYSTOLIC BLOOD PRESSURE: 127 MMHG | DIASTOLIC BLOOD PRESSURE: 93 MMHG

## 2023-01-23 VITALS
SYSTOLIC BLOOD PRESSURE: 131 MMHG | RESPIRATION RATE: 18 BRPM | DIASTOLIC BLOOD PRESSURE: 83 MMHG | HEART RATE: 84 BPM | OXYGEN SATURATION: 95 %

## 2023-01-23 VITALS — BODY MASS INDEX: 33.2 KG/M2

## 2023-01-23 DIAGNOSIS — F17.210: ICD-10-CM

## 2023-01-23 DIAGNOSIS — L05.91: Primary | ICD-10-CM

## 2023-01-23 PROCEDURE — 96374 THER/PROPH/DIAG INJ IV PUSH: CPT

## 2023-01-23 PROCEDURE — 10081 I&D PILONIDAL CYST COMP: CPT

## 2023-01-23 NOTE — SUR.PHASEII
Pt instructed not to fill Norco script called in for him by Dr. Canseco as he has a codeine allergy. Take Regular Tylenol instead, wife and pt verbalized understanding.

## 2023-01-24 VITALS — HEART RATE: 78 BPM | DIASTOLIC BLOOD PRESSURE: 92 MMHG | TEMPERATURE: 97.6 F | SYSTOLIC BLOOD PRESSURE: 134 MMHG

## 2023-03-02 ENCOUNTER — HOSPITAL ENCOUNTER (EMERGENCY)
Age: 43
Discharge: HOME | End: 2023-03-02
Payer: COMMERCIAL

## 2023-03-02 VITALS
SYSTOLIC BLOOD PRESSURE: 121 MMHG | DIASTOLIC BLOOD PRESSURE: 89 MMHG | OXYGEN SATURATION: 98 % | HEART RATE: 71 BPM | RESPIRATION RATE: 20 BRPM | TEMPERATURE: 97.88 F

## 2023-03-02 VITALS
TEMPERATURE: 97.9 F | DIASTOLIC BLOOD PRESSURE: 89 MMHG | SYSTOLIC BLOOD PRESSURE: 121 MMHG | OXYGEN SATURATION: 98 % | HEART RATE: 71 BPM | RESPIRATION RATE: 20 BRPM

## 2023-03-02 VITALS — BODY MASS INDEX: 33.2 KG/M2

## 2023-03-02 DIAGNOSIS — J40: ICD-10-CM

## 2023-03-02 DIAGNOSIS — J32.9: ICD-10-CM

## 2023-03-02 DIAGNOSIS — H66.90: Primary | ICD-10-CM

## 2023-03-02 PROCEDURE — 99214 OFFICE O/P EST MOD 30 MIN: CPT

## 2023-03-02 PROCEDURE — 99212 OFFICE O/P EST SF 10 MIN: CPT

## 2023-03-02 PROCEDURE — G0463 HOSPITAL OUTPT CLINIC VISIT: HCPCS
